# Patient Record
Sex: FEMALE | Race: BLACK OR AFRICAN AMERICAN
[De-identification: names, ages, dates, MRNs, and addresses within clinical notes are randomized per-mention and may not be internally consistent; named-entity substitution may affect disease eponyms.]

---

## 2020-08-24 ENCOUNTER — HOSPITAL ENCOUNTER (EMERGENCY)
Dept: HOSPITAL 72 - EMR | Age: 35
LOS: 1 days | Discharge: HOME | End: 2020-08-25
Payer: COMMERCIAL

## 2020-08-24 VITALS — SYSTOLIC BLOOD PRESSURE: 124 MMHG | DIASTOLIC BLOOD PRESSURE: 65 MMHG

## 2020-08-24 VITALS — BODY MASS INDEX: 32.4 KG/M2 | WEIGHT: 140 LBS | HEIGHT: 55 IN

## 2020-08-24 VITALS — DIASTOLIC BLOOD PRESSURE: 67 MMHG | SYSTOLIC BLOOD PRESSURE: 118 MMHG

## 2020-08-24 DIAGNOSIS — T50.904A: Primary | ICD-10-CM
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
## 2020-08-24 DIAGNOSIS — F19.20: ICD-10-CM

## 2020-08-24 DIAGNOSIS — F29: ICD-10-CM
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
## 2020-08-24 LAB
ADD MANUAL DIFF: NO
ALBUMIN SERPL-MCNC: 3.3 G/DL (ref 3.4–5)
ALBUMIN/GLOB SERPL: 0.9 {RATIO} (ref 1–2.7)
ALP SERPL-CCNC: 65 U/L (ref 46–116)
ALT SERPL-CCNC: 27 U/L (ref 12–78)
ANION GAP SERPL CALC-SCNC: 8 MMOL/L (ref 5–15)
APPEARANCE UR: CLEAR
APTT PPP: (no result) S
AST SERPL-CCNC: 24 U/L (ref 15–37)
BASOPHILS NFR BLD AUTO: 0.6 % (ref 0–2)
BILIRUB SERPL-MCNC: 0.2 MG/DL (ref 0.2–1)
BUN SERPL-MCNC: 12 MG/DL (ref 7–18)
CALCIUM SERPL-MCNC: 9.3 MG/DL (ref 8.5–10.1)
CHLORIDE SERPL-SCNC: 107 MMOL/L (ref 98–107)
CO2 SERPL-SCNC: 27 MMOL/L (ref 21–32)
CREAT SERPL-MCNC: 0.8 MG/DL (ref 0.55–1.3)
EOSINOPHIL NFR BLD AUTO: 5.6 % (ref 0–3)
ERYTHROCYTE [DISTWIDTH] IN BLOOD BY AUTOMATED COUNT: 15.4 % (ref 11.6–14.8)
GLOBULIN SER-MCNC: 3.7 G/DL
GLUCOSE UR STRIP-MCNC: NEGATIVE MG/DL
HCT VFR BLD CALC: 35.2 % (ref 37–47)
HGB BLD-MCNC: 10.8 G/DL (ref 12–16)
KETONES UR QL STRIP: NEGATIVE
LEUKOCYTE ESTERASE UR QL STRIP: (no result)
LYMPHOCYTES NFR BLD AUTO: 29.3 % (ref 20–45)
MCV RBC AUTO: 87 FL (ref 80–99)
MONOCYTES NFR BLD AUTO: 7.6 % (ref 1–10)
NEUTROPHILS NFR BLD AUTO: 57 % (ref 45–75)
NITRITE UR QL STRIP: NEGATIVE
PH UR STRIP: 6 [PH] (ref 4.5–8)
PLATELET # BLD: 294 K/UL (ref 150–450)
POTASSIUM SERPL-SCNC: 3.9 MMOL/L (ref 3.5–5.1)
PROT UR QL STRIP: NEGATIVE
RBC # BLD AUTO: 4.05 M/UL (ref 4.2–5.4)
SODIUM SERPL-SCNC: 142 MMOL/L (ref 136–145)
SP GR UR STRIP: 1.01 (ref 1–1.03)
UROBILINOGEN UR-MCNC: NORMAL MG/DL (ref 0–1)
WBC # BLD AUTO: 6.9 K/UL (ref 4.8–10.8)

## 2020-08-24 PROCEDURE — 96372 THER/PROPH/DIAG INJ SC/IM: CPT

## 2020-08-24 PROCEDURE — 80307 DRUG TEST PRSMV CHEM ANLYZR: CPT

## 2020-08-24 PROCEDURE — 81025 URINE PREGNANCY TEST: CPT

## 2020-08-24 PROCEDURE — 96360 HYDRATION IV INFUSION INIT: CPT

## 2020-08-24 PROCEDURE — 81003 URINALYSIS AUTO W/O SCOPE: CPT

## 2020-08-24 PROCEDURE — 36415 COLL VENOUS BLD VENIPUNCTURE: CPT

## 2020-08-24 PROCEDURE — 80053 COMPREHEN METABOLIC PANEL: CPT

## 2020-08-24 PROCEDURE — 85025 COMPLETE CBC W/AUTO DIFF WBC: CPT

## 2020-08-24 PROCEDURE — 99285 EMERGENCY DEPT VISIT HI MDM: CPT

## 2020-08-24 NOTE — CONSULTATION
DATE OF CONSULTATION:  08/24/2020

HISTORY OF PRESENT ILLNESS:  This is a 37-year-old female with a history of

methamphetamine and polysubstance dependence, who has been admitted to the

hospital on a 5150 after she was agitated and combative.  She was placed

on bilateral restraints.  She was uncooperative.  She was _____ and she

was uncooperative with the evaluation and not able to engage.  During my

evaluation, the patient was calm, was taken out of the restraints, and she

was not endorsing any suicidal or homicidal ideation.  The patient's

toxicology is positive for benzodiazepine and methamphetamines.



PAST PSYCHIATRIC HISTORY:  The patient did not cooperate with the

examination.



PAST MEDICAL HISTORY:  Nonsignificant.



ALLERGIES:  No known drug allergies.



SUBSTANCE ABUSE HISTORY:  Has a history of multiple drug use including

methamphetamine and crystal meth.



MENTAL STATUS EXAMINATION:  The patient is awake, uncooperative.  Mood is

neutral.  Affect is flat.  Thought process, there is a paucity of thought

content.  Thought content, no suicidal or homicidal ideation.  Cognition

is intact.  Insight and judgment is limited.



ASSESSMENT:

Axis I  Polysubstance dependence.

Psychotic disorder, rule out substance-induced psychosis.

Axis II  Deferred.

Axis III  As above.

Axis IV  Low.

Axis V  50



PLAN:

1. We will discontinue the 5150.

2. The patient should be discharged when medically cleared.

3. Discontinue the _____.

4. Discussed with the nurse.









  ______________________________________________

  Branden Adams M.D.





DR:  SUSAN

D:  08/24/2020 16:19

T:  08/24/2020 23:12

JOB#:  [id number]

CC:

## 2020-08-24 NOTE — EMERGENCY ROOM REPORT
History of Present Illness


General


Chief Complaint:  Overdose


Source:  Patient, [employer]


 (Yong Navarro MD)





Present Illness


HPI


37-year-old female, no known past medical history no unknown surgical history 

presents with altered mental status patient was walking around naked in the 

streets, additionally patient ran into oncoming traffic, patient was placed on 

a 5150, history is limited patient unable to give a history talking about 

partying severity is severe, constant unknown aggravating or alleviating 

factors patient presents for evaluation and treatment


 (Yong Navarro MD)


Allergies:  


Coded Allergies:  


     UNABLE TO ASSESS (Unverified , 8/24/20)





COVID-19 Screening


Contact w/high risk pt:  No


Experienced COVID-19 symptoms?:  No


COVID-19 Testing performed PTA:  No


 (Yong Navarro MD)





Patient History


Past Medical History:  see triage record


Reviewed Nursing Documentation:  PMH: Agreed; PSxH: Agreed (Yong Navarro MD)





Nursing Documentation-PMH


Past Medical History:  Deferred


 (Yong Navarro MD)





Review of Systems


All Other Systems:  limited


 (Yong Navarro MD)





Physical Exam





Vital Signs








  Date Time  Temp Pulse Resp B/P (MAP) Pulse Ox O2 Delivery O2 Flow Rate FiO2


 


8/24/20 11:07 98.2 78 22 124/65 (84) 98 Room Air  








General Appearance:  well appearing, no apparent distress


Head:  normocephalic, atraumatic


Eyes:  bilateral eye PERRL, bilateral eye EOMI


ENT:  hearing grossly normal, normal voice


Neck:  full range of motion, supple


Respiratory:  no respiratory distress, speaking full sentences


Neurologic:  alert, normal gait


Psychiatric:  anxious


Skin:  no rash


 (Yong Navarro MD)





Medical Decision Making


Diagnostic Impression:  


 Primary Impression:  


 Drug overdose


 Qualified Codes:  T50.904A - Poisoning by unspecified drugs, medicaments and 

biological substances, undetermined, initial encounter


ER Course


37-year-old female presents with altered mental status possibly drug related, 

patient has glitter on her person patient required sedate of medications to 

obtain a blood draw to medically clear patient. 


Patient medically cleared


Patient pending sobriety and clearance by psychiatry Dr. Adams consulted


Patient signed out to [facility] Emergency [facility] Physician





Laboratory Tests








Test


  8/24/20


11:58 8/24/20


12:35


 


White Blood Count


  6.9 K/UL


(4.8-10.8) 


 


 


Red Blood Count


  4.05 M/UL


(4.20-5.40)  L 


 


 


Hemoglobin


  10.8 G/DL


(12.0-16.0)  L 


 


 


Hematocrit


  35.2 %


(37.0-47.0)  L 


 


 


Mean Corpuscular Volume 87 FL (80-99)   


 


Mean Corpuscular Hemoglobin


  26.5 PG


(27.0-31.0)  L 


 


 


Mean Corpuscular Hemoglobin


Concent 30.6 G/DL


(32.0-36.0)  L 


 


 


Red Cell Distribution Width


  15.4 %


(11.6-14.8)  H 


 


 


Platelet Count


  294 K/UL


(150-450) 


 


 


Mean Platelet Volume


  5.8 FL


(6.5-10.1)  L 


 


 


Neutrophils (%) (Auto)


  57.0 %


(45.0-75.0) 


 


 


Lymphocytes (%) (Auto)


  29.3 %


(20.0-45.0) 


 


 


Monocytes (%) (Auto)


  7.6 %


(1.0-10.0) 


 


 


Eosinophils (%) (Auto)


  5.6 %


(0.0-3.0)  H 


 


 


Basophils (%) (Auto)


  0.6 %


(0.0-2.0) 


 


 


Sodium Level


  142 MMOL/L


(136-145) 


 


 


Potassium Level


  3.9 MMOL/L


(3.5-5.1) 


 


 


Chloride Level


  107 MMOL/L


() 


 


 


Carbon Dioxide Level


  27 MMOL/L


(21-32) 


 


 


Anion Gap


  8 mmol/L


(5-15) 


 


 


Blood Urea Nitrogen


  12 mg/dL


(7-18) 


 


 


Creatinine


  0.8 MG/DL


(0.55-1.30) 


 


 


Estimated Glomerular


Filtration Rate > 60 mL/min


(>60) 


 


 


Glucose Level


  77 MG/DL


() 


 


 


Calcium Level


  9.3 MG/DL


(8.5-10.1) 


 


 


Total Bilirubin


  0.2 MG/DL


(0.2-1.0) 


 


 


Aspartate Amino Transferase


(AST) 24 U/L (15-37)


  


 


 


Alanine Aminotransferase (ALT)


  27 U/L (12-78)


  


 


 


Alkaline Phosphatase


  65 U/L


() 


 


 


Total Protein


  7.0 G/DL


(6.4-8.2) 


 


 


Albumin


  3.3 G/DL


(3.4-5.0)  L 


 


 


Globulin 3.7 g/dL   


 


Albumin/Globulin Ratio


  0.9 (1.0-2.7)


L 


 


 


Salicylates Level


  1.3 ug/mL


(2.8-20)  L 


 


 


Acetaminophen Level


  < 2 MCG/ML


(10-30)  L 


 


 


Serum Alcohol < 3 mg/dL   


 


Urine Color  Pale yellow  


 


Urine Appearance  Clear  


 


Urine pH  6 (4.5-8.0)  


 


Urine Specific Gravity


  


  1.010


(1.005-1.035)


 


Urine Protein


  


  Negative


(NEGATIVE)


 


Urine Glucose (UA)


  


  Negative


(NEGATIVE)


 


Urine Ketones


  


  Negative


(NEGATIVE)


 


Urine Blood


  


  1+ (NEGATIVE)


H


 


Urine Nitrite


  


  Negative


(NEGATIVE)


 


Urine Bilirubin


  


  Negative


(NEGATIVE)


 


Urine Urobilinogen


  


  Normal MG/DL


(0.0-1.0)


 


Urine Leukocyte Esterase


  


  1+ (NEGATIVE)


H


 


Urine RBC


  


  0-2 /HPF (0 -


2)


 


Urine WBC


  


  0-2 /HPF (0 -


2)


 


Urine Squamous Epithelial


Cells 


  Occasional


/LPF


 


Urine Bacteria


  


  Occasional


/HPF (NONE)


 


Urine HCG, Qualitative


  


  Negative


(NEGATIVE)


 


Urine Opiates Screen


  


  Negative


(NEGATIVE)


 


Urine Barbiturates Screen


  


  Negative


(NEGATIVE)


 


Phencyclidine (PCP) Screen


  


  Negative


(NEGATIVE)


 


Urine Amphetamines Screen


  


  Positive


(NEGATIVE)  H


 


Urine Benzodiazepines Screen


  


  Positive


(NEGATIVE)  H


 


Urine Cocaine Screen


  


  Negative


(NEGATIVE)


 


Urine Marijuana (THC) Screen


  


  Negative


(NEGATIVE)








 (Yong Navarro MD)


ER Course


Endorsed me by Dr. Navarro.  She had been placed on a 5150 hold by [employer].  

Patient was seen by a psychiatrist and hold was discontinued.  Patient was 

noted to have been medicated with multiple medications prior to being seen by 

me.  She initially been agitated but had become more calm.  Patient appears to 

be behaving more appropriately at this time.  Per psychiatry patient is okay 

for discharge.  Patient was discharged per psychiatric recommendations.  

Patient psychosis appears to be substance related.  This medical record is 

generated with Dragon transcription software. There may be some transcription 

discrepancies related to use of this software


 (Oneil Bowen MD)





Last Vital Signs








  Date Time  Temp Pulse Resp B/P (MAP) Pulse Ox O2 Delivery O2 Flow Rate FiO2


 


8/24/20 11:08 98.2 78 22 124/65 98 Room Air  








 (Yong Navarro MD)


Status:  improved


 (Oneil Bowen MD)


Disposition:  HOME, SELF-CARE


Condition:  Stable











Yong Navarro MD Aug 24, 2020 11:56


Oneil Bowen MD Aug 26, 2020 07:01 within normal limits

## 2020-08-25 VITALS — DIASTOLIC BLOOD PRESSURE: 67 MMHG | SYSTOLIC BLOOD PRESSURE: 118 MMHG
